# Patient Record
Sex: MALE | Race: WHITE
[De-identification: names, ages, dates, MRNs, and addresses within clinical notes are randomized per-mention and may not be internally consistent; named-entity substitution may affect disease eponyms.]

---

## 2018-12-12 ENCOUNTER — HOSPITAL ENCOUNTER (EMERGENCY)
Dept: HOSPITAL 74 - JER | Age: 33
LOS: 1 days | Discharge: HOME | End: 2018-12-13
Payer: COMMERCIAL

## 2018-12-12 VITALS — TEMPERATURE: 99.3 F

## 2018-12-12 VITALS — BODY MASS INDEX: 36.6 KG/M2

## 2018-12-12 DIAGNOSIS — L03.116: Primary | ICD-10-CM

## 2018-12-12 DIAGNOSIS — F17.210: ICD-10-CM

## 2018-12-13 VITALS — DIASTOLIC BLOOD PRESSURE: 87 MMHG | SYSTOLIC BLOOD PRESSURE: 136 MMHG | HEART RATE: 87 BPM

## 2018-12-13 NOTE — PDOC
Attending Attestation





- Resident


Resident Name: Elizabeth Hayes





- ED Attending Attestation


I have performed the following: I have examined & evaluated the patient, The 

case was reviewed & discussed with the resident, I agree w/resident's findings 

& plan, Exceptions are as noted





- HPI


HPI: 





12/13/18 00:07


33 M with no PMH presenting with red rash to L shin. Pt denies any trauma to 

the area. First noticed the redness about 3 days ago and gradually spread. Pt 

denies any F/C. Denies any purulent drainage or bleeding. No rashes anywhere 

else.  





- Physicial Exam


PE: 





12/13/18 00:08


"GENERAL: Awake, alert, and fully oriented, in no acute distress.


HEAD: No signs of trauma


EYES: PERRLA, EOMI, sclera anicteric, conjunctiva clear


ENT: Auricles normal inspection, hearing grossly normal, nares patent, 

oropharynx clear without exudates. Moist mucosa


NECK: Nontender, no stepoffs, Normal ROM, supple, no lymphadenopathy, JVD, or 

masses


LUNGS: Breath sounds equal, clear to auscultation bilaterally.  No wheezes, and 

no crackles


HEART: Regular rate and rhythm, normal S1 and S2, no murmurs, rubs or gallops


ABDOMEN: Soft, nontender, normoactive bowel sounds.  No guarding, no rebound.  

No masses


EXTREMITIES: Normal range of motion, no edema.  No clubbing or cyanosis. No 

cords, erythema, or tenderness


NEUROLOGICAL: Cranial nerves II through XII intact. 5/5 strength and sensation 

in all extremities, Normal speech, normal gait, normal cerebellar function


SKIN: + 5cm patch of erythema on anterior L shin, no induration/fluctuance noted

, no drainage, no streaking, no ulcers





- Medical Decision Making





12/13/18 00:08


33 M with likely cellulitis of L shin. No s/s systemic illness.


- Keflex





Pt is well appearing, with normal vitals. Clinically stable for DC at this time.


I discussed the physical exam findings, ancillary test results and final 

diagnoses with the patient. I answered all of the patient's questions. The 

patient was satisfied with the care received and felt comfortable with the 

discharge plan and treatment plan.  The patient agrees to follow up with the 

primary care physician within 24-72 hours.

## 2018-12-13 NOTE — PDOC
History of Present Illness





- General


Chief Complaint: Wound


Stated Complaint: LEFT SHIN PAIN & REDNESS


Time Seen by Provider: 12/12/18 23:36


History Source: Patient


Exam Limitations: No Limitations





- History of Present Illness


Initial Comments: 





12/13/18 06:27


Pt is a 32yo M with PMH of Gout presenting to ED with complaints of L shin 

redness and pain that started 3 days ago on Monday. Pt said he noticed redness 

to his shin and it has been getting worse. He endorses fever of 101 on Monday 

and took Tamiflu. He has not had a fever since then.  He denies other 

medication use, injury, trauma, bites, chills, abdominal pain, n/v/d, joint 

pains. 





Allergies: nkda





Past History





- Past Medical History


Allergies/Adverse Reactions: 


 Allergies











Allergy/AdvReac Type Severity Reaction Status Date / Time


 


No Known Allergies Allergy   Verified 12/12/18 23:17











Home Medications: 


Ambulatory Orders





NK [No Known Home Medication]  12/13/18 








COPD: No





- Immunization History


Td Vaccination: No


Immunization Up to Date: No





- Suicide/Smoking/Psychosocial Hx


Smoking Status: Yes


Smoking History: Current some day smoker


Have you smoked in the past 12 months: Yes


Number of Cigarettes Smoked Daily: 1


Information on smoking cessation initiated: No


Hx Alcohol Use: Yes (Occasional)


Substance Use Type: Alcohol





**Review of Systems





- Review of Systems


Constitutional: No: Chills, Fever


HEENTM: No: Symptoms Reported


Respiratory: No: Cough, Shortness of Breath


Cardiac (ROS): No: Chest Pain, Lightheadedness, Palpitations


ABD/GI: No: Constipated, Diarrhea, Nausea, Vomiting, Abdominal cramping


: No: Burning, Dysuria


Musculoskeletal: No: Joint Swelling, Muscle Pain, Muscle Weakness


Integumentary: Yes: See HPI, Erythema, Rash


Neurological: No: Headache, Tingling, Tremors, Weakness





*Physical Exam





- Vital Signs


 Last Vital Signs











Temp Pulse Resp BP Pulse Ox


 


 99.3 F   92 H  18   140/93   99 


 


 12/12/18 23:15  12/12/18 23:15  12/12/18 23:15  12/12/18 23:15  12/12/18 23:15














- Physical Exam


General Appearance: Yes: Nourished, Appropriately Dressed.  No: Apparent 

Distress


HEENT: positive: EOMI, WINNIE, Pharynx Normal


Neck: positive: Trachea midline, Supple.  negative: Lymphadenopathy (R), 

Lymphadenopathy (L)


Respiratory/Chest: positive: Lungs Clear, Normal Breath Sounds


Cardiovascular: positive: Regular Rhythm, Regular Rate, S1, S2.  negative: Edema

, JVD, Murmur


Vascular Pulses: Carotid (R): 2+, Carotid (L): 2+, Dorsalis-Pedis (R): 2+, 

Doralis-Pedis (L): 2+


Gastrointestinal/Abdominal: positive: Normal Bowel Sounds, Soft.  negative: 

Distended, Guarding, Rebound, Tenderness


Musculoskeletal: negative: CVA Tenderness


Extremity: positive: Normal Capillary Refill.  negative: Pedal Edema, Swelling, 

Calf Tenderness


Integumentary: positive: Normal Color, Dry, Warm, Rash (on L shin 5-8cm, 

irregular patch. warm, erythematous and ttp. )


Neurologic: positive: CNs II-XII NML intact, Fully Oriented, Alert, Normal Mood/

Affect, Normal Response, Motor Strength 5/5





Moderate Sedation





- Procedure Monitoring


Vital Signs: 


Procedure Monitoring Vital Signs











Temperature  99.3 F   12/12/18 23:15


 


Pulse Rate  92 H  12/12/18 23:15


 


Respiratory Rate  18   12/12/18 23:15


 


Blood Pressure  140/93   12/12/18 23:15


 


O2 Sat by Pulse Oximetry (%)  99   12/12/18 23:15











Medical Decision Making





- Medical Decision Making





12/13/18 06:30


Pt is a 32yo M with PMH of Gout presenting to ED with complaints of L shin 

redness and pain that started 3 days ago on Monday. Pt said he noticed redness 

to his shin and it has been getting worse. He endorses fever of 101 on Monday 

and took Tamiflu. He has not had a fever since then.  He denies other 

medication use, injury, trauma, bites, chills, abdominal pain, n/v/d, joint 

pains. 


   Vitals; wnl   


   PE: erythematous patch, warm and ttp on L shin. 





Pt most likely has cellulitis. Lesion was marked by marker. Pt does not have 

systemic signs of infection, does not need labs at this time. Will give Pt 

Keflex 500mg in ED and DC with Rx for Keflex 500mg QID. 





Pt agreed to plan. Hemodynamcially stable. Can be dc home. 





*DC/Admit/Observation/Transfer


Diagnosis at time of Disposition: 


Cellulitis


Qualifiers:


 Site of cellulitis: extremity Site of cellulitis of extremity: lower extremity 

Laterality: left Qualified Code(s): L03.116 - Cellulitis of left lower limb








- Discharge Dispostion


Disposition: HOME


Condition at time of disposition: Good


Decision to Admit order: No





- Referrals


Referrals: 


Chon Becker MD [Primary Care Provider] - 





- Patient Instructions


Printed Discharge Instructions:  DI for Cellulitis -- Adult


Additional Instructions: 


You were seen here today for a rash on your leg. It looks like you have 

cellulitis, a skin infection.





You have been prescribed an antibiotic called Keflex (cephalexin). You take it 

four times a day for 1 week. Please stop taking it if you develop rashes on 

your body with itching or difficulty breathing. 





Please keep the area clean and dry. Do not use any harsh soaps or cleaning 

agents. 





Come back to the emergency room if pain gets worse, rash gets bigger, you 

develop fever, you have more rashes, you are unable to move your leg, you have 

joint swelling or if any new concerning symptom develops.





Thank you





- Post Discharge Activity


Forms/Work/School Notes:  Back to Work